# Patient Record
Sex: FEMALE | URBAN - METROPOLITAN AREA
[De-identification: names, ages, dates, MRNs, and addresses within clinical notes are randomized per-mention and may not be internally consistent; named-entity substitution may affect disease eponyms.]

---

## 2018-05-24 ENCOUNTER — APPOINTMENT (RX ONLY)
Dept: URBAN - METROPOLITAN AREA CLINIC 154 | Facility: CLINIC | Age: 83
Setting detail: DERMATOLOGY
End: 2018-05-24

## 2018-05-24 DIAGNOSIS — L81.4 OTHER MELANIN HYPERPIGMENTATION: ICD-10-CM

## 2018-05-24 PROBLEM — E03.9 HYPOTHYROIDISM, UNSPECIFIED: Status: ACTIVE | Noted: 2018-05-24

## 2018-05-24 PROBLEM — M06.9 RHEUMATOID ARTHRITIS, UNSPECIFIED: Status: ACTIVE | Noted: 2018-05-24

## 2018-05-24 PROBLEM — E13.9 OTHER SPECIFIED DIABETES MELLITUS WITHOUT COMPLICATIONS: Status: ACTIVE | Noted: 2018-05-24

## 2018-05-24 PROBLEM — I10 ESSENTIAL (PRIMARY) HYPERTENSION: Status: ACTIVE | Noted: 2018-05-24

## 2018-05-24 PROBLEM — M12.9 ARTHROPATHY, UNSPECIFIED: Status: ACTIVE | Noted: 2018-05-24

## 2018-05-24 PROBLEM — E78.5 HYPERLIPIDEMIA, UNSPECIFIED: Status: ACTIVE | Noted: 2018-05-24

## 2018-05-24 PROBLEM — D48.5 NEOPLASM OF UNCERTAIN BEHAVIOR OF SKIN: Status: ACTIVE | Noted: 2018-05-24

## 2018-05-24 PROCEDURE — ? EDUCATIONAL RESOURCES PROVIDED

## 2018-05-24 PROCEDURE — 99201: CPT | Mod: 25

## 2018-05-24 PROCEDURE — 56605 BIOPSY OF VULVA/PERINEUM: CPT

## 2018-05-24 PROCEDURE — ? COUNSELING

## 2018-05-24 PROCEDURE — ? BIOPSY BY SHAVE METHOD

## 2018-05-24 ASSESSMENT — LOCATION SIMPLE DESCRIPTION DERM: LOCATION SIMPLE: LEFT FOREHEAD

## 2018-05-24 ASSESSMENT — LOCATION DETAILED DESCRIPTION DERM: LOCATION DETAILED: LEFT INFERIOR MEDIAL FOREHEAD

## 2018-05-24 ASSESSMENT — LOCATION ZONE DERM: LOCATION ZONE: FACE

## 2018-05-24 NOTE — HPI: EVALUATION OF SKIN LESION(S)
Hpi Title: Evaluation of a Skin Lesion
How Severe Are Your Spot(S)?: severe
Have Your Spot(S) Been Treated In The Past?: has not been treated
Additional History: Patient using vaseline to area 2-3 x a day

## 2018-06-07 ENCOUNTER — APPOINTMENT (RX ONLY)
Dept: URBAN - METROPOLITAN AREA CLINIC 154 | Facility: CLINIC | Age: 83
Setting detail: DERMATOLOGY
End: 2018-06-07

## 2018-06-07 DIAGNOSIS — A63.0 ANOGENITAL (VENEREAL) WARTS: ICD-10-CM

## 2018-06-07 PROCEDURE — ? EDUCATIONAL RESOURCES PROVIDED

## 2018-06-07 PROCEDURE — ? ADDITIONAL NOTES

## 2018-06-07 PROCEDURE — 17110 DESTRUCTION B9 LES UP TO 14: CPT

## 2018-06-07 PROCEDURE — ? LIQUID NITROGEN

## 2018-06-07 PROCEDURE — ? PATHOLOGY DISCUSSION

## 2018-06-07 PROCEDURE — ? COUNSELING

## 2018-06-07 ASSESSMENT — LOCATION ZONE DERM: LOCATION ZONE: VULVA

## 2018-06-07 ASSESSMENT — LOCATION SIMPLE DESCRIPTION DERM: LOCATION SIMPLE: GROIN

## 2018-06-07 ASSESSMENT — LOCATION DETAILED DESCRIPTION DERM: LOCATION DETAILED: MONS PUBIS

## 2018-06-07 NOTE — PROCEDURE: ADDITIONAL NOTES
Additional Notes: Educated on importance of letting treatment site breathe and therefore to not use such an occlusive bandage as she was. She was provided a couple adhesive Telfa dressing’s and advised to purchase these from a local store for use at home. Vaseline and adhesive telfa dressing placed on site today post treatment.

## 2018-06-07 NOTE — PROCEDURE: LIQUID NITROGEN
Number Of Freeze-Thaw Cycles: 2 freeze-thaw cycles
Topical Anesthesia Type: liquid nitrogen
Medical Necessity Information: It is in your best interest to select a reason for this procedure from the list below. All of these items fulfill various CMS LCD requirements except the new and changing color options.
Detail Level: Detailed
Include Z78.9 (Other Specified Conditions Influencing Health Status) As An Associated Diagnosis?: Yes
Add 52 Modifier (Optional): no
Duration Of Freeze Thaw-Cycle (Seconds): 5-10
Medical Necessity Clause: This procedure was medically necessary because the lesions that were treated were:
Consent: The patient's consent was obtained including but not limited to risks of crusting, scabbing, blistering, scarring, darker or lighter pigmentary change, recurrence, incomplete removal and infection.
Post-Care Instructions: I reviewed with the patient in detail post-care instructions. Patient is to wear sunprotection, and avoid picking at any of the treated lesions. Pt may apply Vaseline to crusted or scabbing areas.

## 2018-07-10 ENCOUNTER — APPOINTMENT (RX ONLY)
Dept: URBAN - METROPOLITAN AREA CLINIC 154 | Facility: CLINIC | Age: 83
Setting detail: DERMATOLOGY
End: 2018-07-10

## 2018-07-10 DIAGNOSIS — A63.0 ANOGENITAL (VENEREAL) WARTS: ICD-10-CM | Status: RESOLVED

## 2018-07-10 PROCEDURE — ? RECOMMENDATIONS

## 2018-07-10 PROCEDURE — ? COUNSELING

## 2018-07-10 PROCEDURE — 99212 OFFICE O/P EST SF 10 MIN: CPT

## 2018-07-10 ASSESSMENT — LOCATION SIMPLE DESCRIPTION DERM: LOCATION SIMPLE: GROIN

## 2018-07-10 ASSESSMENT — LOCATION ZONE DERM: LOCATION ZONE: VULVA

## 2018-07-10 ASSESSMENT — LOCATION DETAILED DESCRIPTION DERM: LOCATION DETAILED: MONS PUBIS

## 2018-07-10 NOTE — PROCEDURE: RECOMMENDATIONS
Recommendation Preamble: The following recommendations were made during the visit:
Recommendations (Free Text): Advised to return to clinic if any new or recurring lesions
Detail Level: Zone

## 2019-01-09 ENCOUNTER — APPOINTMENT (RX ONLY)
Dept: URBAN - METROPOLITAN AREA CLINIC 154 | Facility: CLINIC | Age: 84
Setting detail: DERMATOLOGY
End: 2019-01-09

## 2019-01-09 DIAGNOSIS — A63.0 ANOGENITAL (VENEREAL) WARTS: ICD-10-CM | Status: RESOLVED

## 2019-01-09 DIAGNOSIS — I87.2 VENOUS INSUFFICIENCY (CHRONIC) (PERIPHERAL): ICD-10-CM

## 2019-01-09 PROCEDURE — ? COUNSELING

## 2019-01-09 PROCEDURE — ? RECOMMENDATIONS

## 2019-01-09 PROCEDURE — ? TREATMENT REGIMEN

## 2019-01-09 PROCEDURE — 99213 OFFICE O/P EST LOW 20 MIN: CPT

## 2019-01-09 ASSESSMENT — LOCATION DETAILED DESCRIPTION DERM
LOCATION DETAILED: LEFT DISTAL PRETIBIAL REGION
LOCATION DETAILED: RIGHT DISTAL PRETIBIAL REGION
LOCATION DETAILED: MONS PUBIS

## 2019-01-09 ASSESSMENT — LOCATION SIMPLE DESCRIPTION DERM
LOCATION SIMPLE: GROIN
LOCATION SIMPLE: LEFT PRETIBIAL REGION
LOCATION SIMPLE: RIGHT PRETIBIAL REGION

## 2019-01-09 ASSESSMENT — LOCATION ZONE DERM
LOCATION ZONE: VULVA
LOCATION ZONE: LEG

## 2019-01-09 NOTE — PROCEDURE: RECOMMENDATIONS
Recommendation Preamble: The following recommendations were made during the visit:
Detail Level: Zone
Recommendations (Free Text): Advised to return to clinic if any new or recurring lesions

## 2019-01-09 NOTE — PROCEDURE: TREATMENT REGIMEN
Plan: Patient established and follows with vein specialist already for treatment of venous insufficiency. Discussed and educated on importance of wearing bilateral lower extremity compression stockings or use of ace bandage wrappings
Detail Level: Zone